# Patient Record
Sex: MALE | Race: WHITE | URBAN - METROPOLITAN AREA
[De-identification: names, ages, dates, MRNs, and addresses within clinical notes are randomized per-mention and may not be internally consistent; named-entity substitution may affect disease eponyms.]

---

## 2017-05-08 ENCOUNTER — APPOINTMENT (RX ONLY)
Dept: URBAN - METROPOLITAN AREA CLINIC 142 | Facility: CLINIC | Age: 67
Setting detail: DERMATOLOGY
End: 2017-05-08

## 2017-05-08 DIAGNOSIS — L82.1 OTHER SEBORRHEIC KERATOSIS: ICD-10-CM

## 2017-05-08 DIAGNOSIS — L73.8 OTHER SPECIFIED FOLLICULAR DISORDERS: ICD-10-CM

## 2017-05-08 DIAGNOSIS — L81.4 OTHER MELANIN HYPERPIGMENTATION: ICD-10-CM

## 2017-05-08 DIAGNOSIS — D22 MELANOCYTIC NEVI: ICD-10-CM

## 2017-05-08 DIAGNOSIS — L57.8 OTHER SKIN CHANGES DUE TO CHRONIC EXPOSURE TO NONIONIZING RADIATION: ICD-10-CM

## 2017-05-08 DIAGNOSIS — B07.8 OTHER VIRAL WARTS: ICD-10-CM

## 2017-05-08 PROBLEM — D22.61 MELANOCYTIC NEVI OF RIGHT UPPER LIMB, INCLUDING SHOULDER: Status: ACTIVE | Noted: 2017-05-08

## 2017-05-08 PROBLEM — F41.9 ANXIETY DISORDER, UNSPECIFIED: Status: ACTIVE | Noted: 2017-05-08

## 2017-05-08 PROBLEM — K21.9 GASTRO-ESOPHAGEAL REFLUX DISEASE WITHOUT ESOPHAGITIS: Status: ACTIVE | Noted: 2017-05-08

## 2017-05-08 PROBLEM — I10 ESSENTIAL (PRIMARY) HYPERTENSION: Status: ACTIVE | Noted: 2017-05-08

## 2017-05-08 PROBLEM — L55.1 SUNBURN OF SECOND DEGREE: Status: ACTIVE | Noted: 2017-05-08

## 2017-05-08 PROBLEM — D22.62 MELANOCYTIC NEVI OF LEFT UPPER LIMB, INCLUDING SHOULDER: Status: ACTIVE | Noted: 2017-05-08

## 2017-05-08 PROBLEM — J45.909 UNSPECIFIED ASTHMA, UNCOMPLICATED: Status: ACTIVE | Noted: 2017-05-08

## 2017-05-08 PROBLEM — F32.9 MAJOR DEPRESSIVE DISORDER, SINGLE EPISODE, UNSPECIFIED: Status: ACTIVE | Noted: 2017-05-08

## 2017-05-08 PROBLEM — D48.5 NEOPLASM OF UNCERTAIN BEHAVIOR OF SKIN: Status: ACTIVE | Noted: 2017-05-08

## 2017-05-08 PROBLEM — E78.5 HYPERLIPIDEMIA, UNSPECIFIED: Status: ACTIVE | Noted: 2017-05-08

## 2017-05-08 PROCEDURE — ? BIOPSY BY SHAVE METHOD

## 2017-05-08 PROCEDURE — ? LIQUID NITROGEN

## 2017-05-08 PROCEDURE — 99202 OFFICE O/P NEW SF 15 MIN: CPT | Mod: 25

## 2017-05-08 PROCEDURE — 69100 BIOPSY OF EXTERNAL EAR: CPT

## 2017-05-08 PROCEDURE — 17110 DESTRUCTION B9 LES UP TO 14: CPT

## 2017-05-08 PROCEDURE — ? COUNSELING

## 2017-05-08 ASSESSMENT — LOCATION DETAILED DESCRIPTION DERM
LOCATION DETAILED: LEFT CLAVICULAR SKIN
LOCATION DETAILED: LEFT PROXIMAL DORSAL FOREARM
LOCATION DETAILED: LEFT INFERIOR CENTRAL MALAR CHEEK
LOCATION DETAILED: LEFT PROXIMAL POSTERIOR UPPER ARM
LOCATION DETAILED: RIGHT PROXIMAL DORSAL FOREARM
LOCATION DETAILED: LEFT ELBOW
LOCATION DETAILED: RIGHT INFERIOR CENTRAL MALAR CHEEK
LOCATION DETAILED: LEFT DISTAL DORSAL FOREARM

## 2017-05-08 ASSESSMENT — LOCATION SIMPLE DESCRIPTION DERM
LOCATION SIMPLE: RIGHT CHEEK
LOCATION SIMPLE: LEFT CLAVICULAR SKIN
LOCATION SIMPLE: LEFT ELBOW
LOCATION SIMPLE: LEFT CHEEK
LOCATION SIMPLE: LEFT UPPER ARM
LOCATION SIMPLE: RIGHT FOREARM
LOCATION SIMPLE: LEFT FOREARM

## 2017-05-08 ASSESSMENT — LOCATION ZONE DERM
LOCATION ZONE: ARM
LOCATION ZONE: TRUNK
LOCATION ZONE: FACE

## 2017-05-08 ASSESSMENT — TOTAL NUMBER OF VERRUCA VULGARIS: # OF LESIONS?: 1

## 2017-05-08 ASSESSMENT — PAIN INTENSITY VAS: HOW INTENSE IS YOUR PAIN 0 BEING NO PAIN, 10 BEING THE MOST SEVERE PAIN POSSIBLE?: NO PAIN

## 2017-05-08 NOTE — PROCEDURE: LIQUID NITROGEN
Medical Necessity Information: It is in your best interest to select a reason for this procedure from the list below. All of these items fulfill various CMS LCD requirements except the new and changing color options.
Add 52 Modifier (Optional): no
Number Of Freeze-Thaw Cycles: 2 freeze-thaw cycles
Detail Level: Detailed
Duration Of Freeze Thaw-Cycle (Seconds): 5-10
Consent: The patient's verbal consent was obtained including but not limited to risks of crusting, scabbing, blistering, scarring, darker or lighter pigmentary change, recurrence, incomplete removal and infection.
Medical Necessity Clause: This procedure was medically necessary because the lesions that were treated were: growing,
Post-Care Instructions: I reviewed with the patient in detail post-care instructions. Patient is to wear sunprotection, and avoid picking at any of the treated lesions. Pt may apply Vaseline to crusted or scabbing areas.

## 2017-05-08 NOTE — PROCEDURE: BIOPSY BY SHAVE METHOD
Type Of Destruction Used: Curettage
Electrodesiccation Text: The wound bed was treated with electrodesiccation after the biopsy was performed.
Bill For Surgical Tray: no
Consent: Verbal consent was obtained and risks were reviewed including but not limited to scarring, infection, bleeding, scabbing, incomplete removal, nerve damage and allergy to anesthesia.
Detail Level: Detailed
Lab Facility: [id number]
Billing Type: Third-Party Bill
Silver Nitrate Text: The wound bed was treated with silver nitrate after the biopsy was performed.
Dressing: pressure dressing with telfa
X Size Of Lesion In Cm: 0
Anesthesia Type: 1% lidocaine with epinephrine and a 1:10 solution of 8.4% sodium bicarbonate
Lab: [id number]
Anesthesia Volume In Cc (Will Not Render If 0): 1
Hemostasis: Drysol
Cryotherapy Text: The wound bed was treated with cryotherapy after the biopsy was performed.
Post-Care Instructions: I reviewed with the patient in detail post-care instructions. Patient is to keep the biopsy site dry overnight, and then apply bacitracin twice daily until healed. Patient may apply hydrogen peroxide soaks to remove any crusting.
Biopsy Method: Dermablade
Biopsy Type: H and E
Notification Instructions: Patient will be notified of biopsy results. However, patient instructed to call the office if not contacted within 2 weeks.
Electrodesiccation And Curettage Text: The wound bed was treated with electrodesiccation and curettage after the biopsy was performed.
Wound Care: Vaseline

## 2017-06-05 ENCOUNTER — APPOINTMENT (RX ONLY)
Dept: URBAN - METROPOLITAN AREA CLINIC 142 | Facility: CLINIC | Age: 67
Setting detail: DERMATOLOGY
End: 2017-06-05

## 2017-06-05 DIAGNOSIS — L57.0 ACTINIC KERATOSIS: ICD-10-CM

## 2017-06-05 DIAGNOSIS — B07.8 OTHER VIRAL WARTS: ICD-10-CM | Status: RESOLVED

## 2017-06-05 PROCEDURE — ? COUNSELING

## 2017-06-05 PROCEDURE — ? LIQUID NITROGEN

## 2017-06-05 PROCEDURE — 99213 OFFICE O/P EST LOW 20 MIN: CPT | Mod: 25

## 2017-06-05 PROCEDURE — 17000 DESTRUCT PREMALG LESION: CPT

## 2017-06-05 ASSESSMENT — LOCATION SIMPLE DESCRIPTION DERM
LOCATION SIMPLE: LEFT ELBOW
LOCATION SIMPLE: LEFT EAR

## 2017-06-05 ASSESSMENT — LOCATION DETAILED DESCRIPTION DERM
LOCATION DETAILED: LEFT ELBOW
LOCATION DETAILED: LEFT ANTITRAGUS

## 2017-06-05 ASSESSMENT — LOCATION ZONE DERM
LOCATION ZONE: EAR
LOCATION ZONE: ARM

## 2017-06-05 NOTE — PROCEDURE: LIQUID NITROGEN
Number Of Freeze-Thaw Cycles: 2 freeze-thaw cycles
Consent: The patient's verbal consent was obtained including but not limited to risks of crusting, scabbing, blistering, scarring, darker or lighter pigmentary change, recurrence, incomplete removal and infection.
Duration Of Freeze Thaw-Cycle (Seconds): 6
Detail Level: Detailed
Render Post-Care Instructions In Note?: no
Post-Care Instructions: I reviewed with the patient in detail post-care instructions. Patient is to wear sunprotection, and avoid picking at any of the treated lesions. Pt may apply Vaseline to crusted or scabbing areas.

## 2020-06-06 ENCOUNTER — HOSPITAL ENCOUNTER (INPATIENT)
Dept: HOSPITAL 35 - ER | Age: 70
LOS: 5 days | Discharge: NURSING, REHAB OR LONG-TERM CARE | DRG: 882 | End: 2020-06-11
Attending: PSYCHIATRY & NEUROLOGY | Admitting: PSYCHIATRY & NEUROLOGY
Payer: COMMERCIAL

## 2020-06-06 VITALS — HEIGHT: 67 IN | BODY MASS INDEX: 27.56 KG/M2 | WEIGHT: 175.6 LBS

## 2020-06-06 VITALS — DIASTOLIC BLOOD PRESSURE: 71 MMHG | SYSTOLIC BLOOD PRESSURE: 119 MMHG

## 2020-06-06 VITALS — DIASTOLIC BLOOD PRESSURE: 51 MMHG | SYSTOLIC BLOOD PRESSURE: 107 MMHG

## 2020-06-06 VITALS — SYSTOLIC BLOOD PRESSURE: 139 MMHG | DIASTOLIC BLOOD PRESSURE: 66 MMHG

## 2020-06-06 VITALS — DIASTOLIC BLOOD PRESSURE: 64 MMHG | SYSTOLIC BLOOD PRESSURE: 101 MMHG

## 2020-06-06 DIAGNOSIS — F10.20: ICD-10-CM

## 2020-06-06 DIAGNOSIS — F32.9: ICD-10-CM

## 2020-06-06 DIAGNOSIS — J44.9: ICD-10-CM

## 2020-06-06 DIAGNOSIS — Z03.818: ICD-10-CM

## 2020-06-06 DIAGNOSIS — I10: ICD-10-CM

## 2020-06-06 DIAGNOSIS — F43.24: Primary | ICD-10-CM

## 2020-06-06 DIAGNOSIS — Z79.899: ICD-10-CM

## 2020-06-06 DIAGNOSIS — R45.851: ICD-10-CM

## 2020-06-06 DIAGNOSIS — F01.50: ICD-10-CM

## 2020-06-06 DIAGNOSIS — E78.5: ICD-10-CM

## 2020-06-06 DIAGNOSIS — Z88.8: ICD-10-CM
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
## 2020-06-06 DIAGNOSIS — Z79.82: ICD-10-CM

## 2020-06-06 DIAGNOSIS — K21.9: ICD-10-CM

## 2020-06-06 DIAGNOSIS — F19.90: ICD-10-CM

## 2020-06-06 LAB
ALBUMIN SERPL-MCNC: 3.4 G/DL (ref 3.4–5)
ALT SERPL-CCNC: 24 U/L (ref 30–65)
ANION GAP SERPL CALC-SCNC: 7 MMOL/L (ref 7–16)
AST SERPL-CCNC: 20 U/L (ref 15–37)
BASOPHILS NFR BLD AUTO: 1.1 % (ref 0–2)
BENZODIAZ UR-MCNC: POSITIVE UG/L
BILIRUB SERPL-MCNC: 0.5 MG/DL (ref 0.2–1)
BILIRUB UR-MCNC: NEGATIVE MG/DL
BUN SERPL-MCNC: 19 MG/DL (ref 7–18)
CALCIUM SERPL-MCNC: 8.8 MG/DL (ref 8.5–10.1)
CHLORIDE SERPL-SCNC: 104 MMOL/L (ref 98–107)
CO2 SERPL-SCNC: 28 MMOL/L (ref 21–32)
COLOR UR: YELLOW
CREAT SERPL-MCNC: 1.1 MG/DL (ref 0.7–1.3)
EOSINOPHIL NFR BLD: 2.8 % (ref 0–3)
ERYTHROCYTE [DISTWIDTH] IN BLOOD BY AUTOMATED COUNT: 14.4 % (ref 10.5–14.5)
GLUCOSE SERPL-MCNC: 96 MG/DL (ref 74–106)
GRANULOCYTES NFR BLD MANUAL: 54.7 % (ref 36–66)
HCT VFR BLD CALC: 43.9 % (ref 42–52)
HGB BLD-MCNC: 15 GM/DL (ref 14–18)
KETONES UR STRIP-MCNC: (no result) MG/DL
LYMPHOCYTES NFR BLD AUTO: 30.8 % (ref 24–44)
MCH RBC QN AUTO: 31.3 PG (ref 26–34)
MCHC RBC AUTO-ENTMCNC: 34.2 G/DL (ref 28–37)
MCV RBC: 91.7 FL (ref 80–100)
MONOCYTES NFR BLD: 10.6 % (ref 1–8)
NEUTROPHILS # BLD: 3.1 THOU/UL (ref 1.4–8.2)
PLATELET # BLD: 131 THOU/UL (ref 150–400)
POTASSIUM SERPL-SCNC: 3.9 MMOL/L (ref 3.5–5.1)
PROT SERPL-MCNC: 7.1 G/DL (ref 6.4–8.2)
RBC # BLD AUTO: 4.79 MIL/UL (ref 4.5–6)
RBC # UR STRIP: NEGATIVE /UL
SODIUM SERPL-SCNC: 139 MMOL/L (ref 136–145)
SP GR UR STRIP: 1.02 (ref 1–1.03)
URINE CLARITY: CLEAR
URINE GLUCOSE-RANDOM*: NEGATIVE
URINE LEUKOCYTES-REFLEX: NEGATIVE
URINE NITRITE-REFLEX: NEGATIVE
URINE PROTEIN (DIPSTICK): NEGATIVE
UROBILINOGEN UR STRIP-ACNC: >= 8 E.U./DL (ref 0.2–1)
WBC # BLD AUTO: 5.7 THOU/UL (ref 4–11)

## 2020-06-06 PROCEDURE — 10880: CPT

## 2020-06-06 NOTE — NUR
DR BECKER CALLED TO ENSURE PT IS ABLE TO GIVE A URIUNE SPECIMEN.  ADVISED
TO STRAIGHT-CATH PT IF UNWILLING OR UNABLE.  CANNOT DETERMINE ELIGIBILITY FOR 
PSYCH UNIT UNTIL SPECIMEN IS COLLECTED TO R/O UTI
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
## 2020-06-07 VITALS — SYSTOLIC BLOOD PRESSURE: 143 MMHG | DIASTOLIC BLOOD PRESSURE: 81 MMHG

## 2020-06-07 VITALS — DIASTOLIC BLOOD PRESSURE: 61 MMHG | SYSTOLIC BLOOD PRESSURE: 105 MMHG

## 2020-06-07 VITALS — SYSTOLIC BLOOD PRESSURE: 105 MMHG | DIASTOLIC BLOOD PRESSURE: 61 MMHG
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
## 2020-06-07 NOTE — NUR
Assumed care of pt @ 1900. Pt calm et cooperative with pleasant demeanor this
shift. Took medications whole without difficulty. Isolated in room most of
shift. Very pleasant with this nurse et stated to have a "blessed day" after
every encounter. VSWNL. Health assessment with no abnormalities noted at
present time. Denies SI/HI et states that he is aware that is why he is here
but he is not feeling suicidal at the present time. Did not socialize with
peers or roommate this shift. Currently resting in bed with eyes closed. Will
continue to monitor per protocol.

## 2020-06-07 NOTE — NUR
ASSUMED CARE OF PATIENT AT APPROXIMATELY 1915, PATIENT IN DAY ROOM MINIMALLY
INTERACTING WITH OTHER PEERS. UPON ONE TO ONE PT OBSERVED WITH A BLUNTED
AFFECT, AND WOULD MINIMALLY VERBALIZE WITH THIS NURSE. PATIENT THEN WENT TO
HIS ROOM AND STOOD IN THE DOOR WAY. THIS NURSE ADMINISTERED HS MEDICATION TO
PATIENT AND PATIENT STATED "ITS ABOUT DAMN TIME." HE BECAME DEMANDING IN NEEDS
AT THIS TIME, BUT WAS PROVIDED. AT THIS TIME PATIENT IS IN BED WITH EYES
CLOSED, RR EVEN AND UNLABORED, NO S/S OF DISTRESS. PT DID NOT REPORT MEDICAL
CONCERNS. NURSING WILL MAINTAIN ALL PRECAUTIONS TO ENSURE SAFETY AT ALL TIMES.

## 2020-06-07 NOTE — NUR
PT SITTING OUT IN DINING ROOM. PT STATED HE HAS ANXIETY OF 8 ON 1-10 SCALE. PT
DENIES ANY PAIN. PT TOOK MEDS WITHOUT ANY ISSUES. NO SIGNS OF TREMORS OR
HEADACHE. PT WANTING TO USE THE PHONE AFTER BREAKFAST.

## 2020-06-08 VITALS — SYSTOLIC BLOOD PRESSURE: 138 MMHG | DIASTOLIC BLOOD PRESSURE: 85 MMHG

## 2020-06-08 VITALS — SYSTOLIC BLOOD PRESSURE: 93 MMHG | DIASTOLIC BLOOD PRESSURE: 51 MMHG

## 2020-06-08 VITALS — DIASTOLIC BLOOD PRESSURE: 51 MMHG | SYSTOLIC BLOOD PRESSURE: 93 MMHG

## 2020-06-08 NOTE — NUR
PATIENT AWOKE AND CAME TO NURSES STATION. HE STATES HE CAN'T SLEEP AND WANTS
SOMETHING TO HELP SLEEP. HE STATES HIS BACK WAS ACHING SOME TOO. TYLENOL 650MG
WAS SUGGESTED AND PATIENT SAID NO THAT HE NEEDS SOMETHING FOR SLEEP MORE. WENT
AHEAD AND GAVE PATIENT TRAZADONE 50MG WITH TYLENOL 650MG PO FOR BACK PAIN AND
SLEEP. 5/10 ON PAIN. WHEN I TOOK THE MEDS TO THE PATIENT HE STATES THAT HE IS
HAVING REOCCURRING NIGHTMARES AGAIN. I ASKED HIM IF THEY WERE SOMETHING THAT
HAPPENED TO HIM IN THE PAST OR SOMETHING TOTALLY DIFFERENT. HE STATED, "THEY
ARE JUST VERY BLOODY NIGHTMARES." HE DID NOT WANT TO TALK ABOUT IT BEYOND
THAT. PATIENT TOOK PILLS WHOLE WITH WATER AND LAID BACK DOWN TO SLEEP. HE WAS
PLEASANT AND COOOPERATIVE AND APPEARRED CALM. CONTINUING TO MONITOR.

## 2020-06-08 NOTE — NUR
PT GIVEN OLANZAPINE 2.5MG PO WITH WATER. PATIENT LAID BACK DOWN TO TRY AN
SLEEP. PT IS IRRITABLE FROM BROKEN SLEEP AND THE BLOODY NIGHTMARES HE IS
HAVING. HE DID ASK WHAT THE MED WAS AND IS HOPING IT WILL WORK FOR HIS NIGHT
DAVIES.

## 2020-06-08 NOTE — NUR
CHUCKY HAS BEEN VERY CORGAL WITH STAFF AND DR. LOCKE RECIEVED ORDERS FROM  TO HELP
WITH SLEEP TONIGHT.

## 2020-06-08 NOTE — EKG
[facility]
[address]
[place]   [address]                     ELECTROCARDIOGRAM REPORT      
_______________________________________________________________________________
 
Name:       DAMIAN CISSE               Room #:         [facility]      ADM IN  
M.R.#:      [id number]                       Account #:      [id number]  
Admission:  20    Attend Phys:    Marshal Plasencia DO
Discharge:              Date of Birth:  [birth year]  
                                                          Report #: [id number]
                                                                    [id number]
_______________________________________________________________________________
THIS REPORT FOR:  
 
cc:  FAM - Family physician unknown
     FAM - Family physician unknown
     Shaheed Marroquin MD [facility]
THIS REPORT FOR:   //name//                          
 
                         [facility] ED
                                       
Test Date:    2020               Test Time:    10:34:54
Pat Name:     DAMIAN CISSE            Department:   
Patient ID:   [id number]            Room:         [facility]
Gender:       M                        Technician:   DENIS
:          1950               Requested By: Florian Harmon
Order Number: [id number] MD:   Shaheed Marroquin
                                 Measurements
Intervals                              Axis          
Rate:         72                       P:            49
RI:           184                      QRS:          -30
QRSD:         118                      T:            61
QT:           412                                    
QTc:          451                                    
                           Interpretive Statements
Sinus rhythm
Incomplete RBBB and LAFB
No previous ECG available for comparison
 
Electronically Signed On 2020 8:37:12 CDT by Shaheed Marroquin
[email or web address]
 
 
 
 
 
 
 
 
 
 
 
 
 
 
 
  <ELECTRONICALLY SIGNED>
   By: Shaheed Marroquin MD, FACC   
  20     0837
D: 20 1034                           _____________________________________
T: 20 1034                           Shaheed Marroquin MD, [facility]     /EPI

## 2020-06-08 NOTE — NUR
Patient up again at this time.  Patient ran out of room saying "It isn't
working".  After further assessment, patient states that he is able to fall
asleep but once he falls asleep, he is having bloody nightmares of people
being killed.  Patient appears anxious, states that he is scared.  Patient was
not willing to elaborate any further on nightmares.

## 2020-06-08 NOTE — NUR
THERESA left message with  [facility]
[phone] asking for a return call to discuss patient's history and a copy
of patient's medical record.
 
Dr. Plasencia left message for patient's PA  asking for return call
to discuss patient's history.
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
## 2020-06-08 NOTE — NUR
Care of patient assumed at 1915.  Patient is in and out of day room.  Calm and
ccoperative with assessment.  States that [facility] sent him here because
they messed up all of his meds and didn't know how to fix it.  Rates anxiety
and depression 7/10 while laughing and joking.  Rates lower back pain 7/10 on
numeric scale, while PAINAD scale scores 0.  Does not request any pain meds.
A/O x4.  Reports last BM this evening (6/8).  HS, LS, BS all WNL.  Compliant
with HS meds and retires to bed shortly after.

## 2020-06-09 VITALS — DIASTOLIC BLOOD PRESSURE: 48 MMHG | SYSTOLIC BLOOD PRESSURE: 93 MMHG

## 2020-06-09 VITALS — SYSTOLIC BLOOD PRESSURE: 118 MMHG | DIASTOLIC BLOOD PRESSURE: 71 MMHG

## 2020-06-09 NOTE — NUR
[facility] CARE ON 06/08/20 @ 2020, IN BED AT BEGINNING OF SHIFT.  REPORTS WATERY
DIARRHEA TODAY. DENIES FOOD ALLERGIES.  SAYS THE HOSPITAL FOOD GIVES HIM
DIARRHEA.  DENIES SI AND HI. REPORTS HE IS IN THE HOSPITAL BECUSE OF SUICIDAL
THOUGHTS.  AND BECAUSE HE DID NOT LIKE  [facility].  DENIES H, A&V. DENIES
PAIN.  A&O X 3, REPORTS THAT HE HIMSELF IS THE PRESIDENT.

## 2020-06-09 NOTE — NUR
Obie called a second time to admssions at [facility] and left another VM. OBIE
also tried the coordiantor  but unable to leave a VM because her
VM box is full.

## 2020-06-09 NOTE — NUR
THERESA called and confimred with the COVID 19 HS Diane that this pt will be
visitied by Barbara AZEVEDO at 1pm on 6/10.

## 2020-06-09 NOTE — NUR
Up ambulating in unit with slow, steady gait.  Alert and orientated X4.
States he is here d/t not liking [facility].  Irritated and frustrated with
roommate, states he kept him up all night and he couldn't sleep.  Denies
SI/HI.  Calm and compliant later in day.  Breath sounds clear t/o with
occassional slight expiratory wheeze.  Reg HR auscultated.  Color pink with
brisk capillary refill and palpable peripheral pulses.  No edema noted.
Independent with voiding.  Active bowel sounds over soft, rounded abdomen.
Stated last BM was yesterday but then came to me later in AM stating he had 3
loose stools in 3 mins.  Encouraged to drink fluids and gave cup of H2O.
Denied additional stools at this writing.  Currently sitting in day room
watching TV with peers.  No s/o distress.

## 2020-06-10 VITALS — SYSTOLIC BLOOD PRESSURE: 93 MMHG | DIASTOLIC BLOOD PRESSURE: 48 MMHG

## 2020-06-10 VITALS — SYSTOLIC BLOOD PRESSURE: 120 MMHG | DIASTOLIC BLOOD PRESSURE: 65 MMHG

## 2020-06-10 VITALS — DIASTOLIC BLOOD PRESSURE: 74 MMHG | SYSTOLIC BLOOD PRESSURE: 138 MMHG

## 2020-06-10 NOTE — NUR
Obie received a VM that the auth was not completed yet and there might be an
issue with a later d/c. Obie checked with Dr ellison and he advised that d/c
can be first thing tomorrow am.OBIE relayed this rox  VM to Edwardo at [facility] and nursing.

## 2020-06-10 NOTE — NUR
Assumed care on 06/10/20 @ 1900, pleasant affect, greeted this nurse with a
wave and a smile. A&Ox3-4. Cooperated with assessment, reports looking
forward to leaving the hospital tomorrow.  Took meds whole with water. Wife
reports to staff that she only wants to see him through a window at the
facility to which he is discharging. patient reorts diarrhea, although reports
it is not as bad today.  Trazadone 150 provided @ 20:45. Denies pain. In bed
eyes closed, respirations even and unlabored, bed in low position.

## 2020-06-10 NOTE — NUR
OBIE spoke with admissions this AM and reported that pt was ready to d/c. Obie
then faxed the pt and ot notes iwth the neg COVID 19 test results. Later
admissions from [facility] called and they are now pending auth for pt to
return. It is possible this could be today. Later Obie set up a DOXY.ME
conference with Barbara AZEVEDO and this pt instread of the visit that was set for
today at 1pm. Pt visited with Barbara and then Obie was able to report that pt was
likely d/c today or maybe tomorrow.

## 2020-06-11 VITALS — SYSTOLIC BLOOD PRESSURE: 118 MMHG | DIASTOLIC BLOOD PRESSURE: 64 MMHG

## 2020-06-11 VITALS — SYSTOLIC BLOOD PRESSURE: 138 MMHG | DIASTOLIC BLOOD PRESSURE: 74 MMHG

## 2020-06-11 NOTE — NUR
0715  Up ambulating in unit without s/o distress.  Regular, steady gait.
Alert and orientated X 4.  Unhappy with plan to transfer back to [place].
Denies SI/HI and states that he knows he made a mistake in claiming SI that
got him transferred.  Breath sounds clear t/o with good aeration.  No s/o resp
distress.  Reg HR auscultated.  Color pink with brisk capillary refill and
palpable peripheral pulses.  No s/o edema.  Independent with voiding.  Active
bowel sounds over soft, rounded abdomen.  No s/o pressure sores or lesions.
 
1120 Report called to [facility].  Also called public
, talked with .  Aware of pending transfer and
consent to transfer.
 
1210  Reviewed discharge instructions with pt which he signed.  No s/o
distress.  Transported per WC to transport van.

## 2020-06-11 NOTE — NUR
OBIE spoke with Edwardo with admisions at [facility] and it was reported that
they were trying to skill him again but insurance was denying it. OBIE advised
that pt will have to go back there today and their business office will have
to complete the [place] medicaid change if needed. They are aware that this pt was
expected to be with them unitl his court date regarding his guardianship in 4
months. Obie setup transportation for 12pm. Reported this to nursing.

## 2020-06-11 NOTE — NUR
Obie received a meenu from the PA with [facility] and she confimred she
recieved the d/c summary and granted permission for this pt to back to [facility]
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
## 2021-03-22 ENCOUNTER — HOSPITAL ENCOUNTER (EMERGENCY)
Dept: HOSPITAL 96 - M.ERS | Age: 71
Discharge: HOME | End: 2021-03-22
Payer: MEDICARE

## 2021-03-22 VITALS — WEIGHT: 180.01 LBS | HEIGHT: 67 IN | BODY MASS INDEX: 28.25 KG/M2

## 2021-03-22 VITALS — DIASTOLIC BLOOD PRESSURE: 72 MMHG | SYSTOLIC BLOOD PRESSURE: 135 MMHG

## 2021-03-22 DIAGNOSIS — F10.129: Primary | ICD-10-CM

## 2021-03-22 DIAGNOSIS — Z20.822: ICD-10-CM

## 2021-03-22 DIAGNOSIS — J44.9: ICD-10-CM
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
## 2021-03-22 LAB
ALBUMIN SERPL-MCNC: 3.3 G/DL (ref 3.4–5)
ALP SERPL-CCNC: 88 U/L (ref 46–116)
ALT SERPL-CCNC: 40 U/L (ref 30–65)
ANION GAP SERPL CALC-SCNC: 12 MMOL/L (ref 7–16)
APAP SERPL-MCNC: < 2 UG/ML (ref 10–30)
AST SERPL-CCNC: 35 U/L (ref 15–37)
BENZODIAZ UR-MCNC: POSITIVE UG/L
BILIRUB SERPL-MCNC: 0.3 MG/DL
BILIRUB UR-MCNC: NEGATIVE MG/DL
BUN SERPL-MCNC: 9 MG/DL (ref 7–18)
CALCIUM SERPL-MCNC: 8.5 MG/DL (ref 8.5–10.1)
CHLORIDE SERPL-SCNC: 108 MMOL/L (ref 98–107)
CO2 SERPL-SCNC: 26 MMOL/L (ref 21–32)
COLOR UR: YELLOW
CREAT SERPL-MCNC: 0.9 MG/DL (ref 0.6–1.3)
ETHANOL SERPL-MCNC: 218 MG/DL (ref ?–10)
GLUCOSE SERPL-MCNC: 107 MG/DL (ref 70–99)
HCT VFR BLD CALC: 42.8 % (ref 42–52)
HGB BLD-MCNC: 14.2 GM/DL (ref 14–18)
KETONES UR STRIP-MCNC: NEGATIVE MG/DL
MCH RBC QN AUTO: 30.3 PG (ref 26–34)
MCHC RBC AUTO-ENTMCNC: 33.2 G/DL (ref 28–37)
MCV RBC: 91.2 FL (ref 80–100)
MPV: 7.4 FL. (ref 7.2–11.1)
NITRITE UR QL STRIP: NEGATIVE
PLATELET COUNT*: 194 THOU/UL (ref 150–400)
POTASSIUM SERPL-SCNC: 3.3 MMOL/L (ref 3.5–5.1)
PROT SERPL-MCNC: 7.1 G/DL (ref 6.4–8.2)
PROT UR QL STRIP: NEGATIVE
RBC # BLD AUTO: 4.69 MIL/UL (ref 4.5–6)
RBC # UR STRIP: NEGATIVE /UL
RDW-CV: 17 % (ref 10.5–14.5)
SALICYLATES SERPL-MCNC: < 2.8 MG/DL (ref 2.8–20)
SODIUM SERPL-SCNC: 146 MMOL/L (ref 136–145)
SP GR UR STRIP: 1.02 (ref 1–1.03)
URINE CLARITY: CLEAR
URINE GLUCOSE-RANDOM: NEGATIVE
URINE LEUKOCYTES: NEGATIVE
UROBILINOGEN UR STRIP-ACNC: 0.2 E.U./DL (ref 0.2–1)
WBC # BLD AUTO: 6.1 THOU/UL (ref 4–11)

## 2021-07-29 ENCOUNTER — HOSPITAL ENCOUNTER (INPATIENT)
Dept: HOSPITAL 96 - M.ERS | Age: 71
LOS: 15 days | Discharge: HOME HEALTH | DRG: 896 | End: 2021-08-13
Attending: INTERNAL MEDICINE | Admitting: INTERNAL MEDICINE
Payer: COMMERCIAL

## 2021-07-29 VITALS — DIASTOLIC BLOOD PRESSURE: 79 MMHG | SYSTOLIC BLOOD PRESSURE: 140 MMHG

## 2021-07-29 VITALS — BODY MASS INDEX: 28.73 KG/M2 | HEIGHT: 68 IN | WEIGHT: 189.6 LBS

## 2021-07-29 DIAGNOSIS — G92: ICD-10-CM

## 2021-07-29 DIAGNOSIS — Z87.891: ICD-10-CM

## 2021-07-29 DIAGNOSIS — R45.851: ICD-10-CM

## 2021-07-29 DIAGNOSIS — I25.10: ICD-10-CM

## 2021-07-29 DIAGNOSIS — E43: ICD-10-CM

## 2021-07-29 DIAGNOSIS — F10.239: ICD-10-CM

## 2021-07-29 DIAGNOSIS — E87.6: ICD-10-CM

## 2021-07-29 DIAGNOSIS — J44.1: ICD-10-CM

## 2021-07-29 DIAGNOSIS — I25.2: ICD-10-CM

## 2021-07-29 DIAGNOSIS — F10.229: Primary | ICD-10-CM

## 2021-07-29 DIAGNOSIS — F32.9: ICD-10-CM

## 2021-07-29 DIAGNOSIS — Z79.899: ICD-10-CM

## 2021-07-29 DIAGNOSIS — I69.354: ICD-10-CM

## 2021-07-29 DIAGNOSIS — Z20.822: ICD-10-CM

## 2021-07-29 DIAGNOSIS — I10: ICD-10-CM

## 2021-07-29 DIAGNOSIS — E87.0: ICD-10-CM

## 2021-07-29 DIAGNOSIS — E78.5: ICD-10-CM

## 2021-07-29 LAB
ANION GAP SERPL CALC-SCNC: 15 MMOL/L (ref 7–16)
APAP SERPL-MCNC: < 2 UG/ML (ref 10–30)
BENZODIAZ UR-MCNC: POSITIVE UG/L
BILIRUB UR-MCNC: NEGATIVE MG/DL
BUN SERPL-MCNC: 5 MG/DL (ref 7–18)
CALCIUM SERPL-MCNC: 8 MG/DL (ref 8.5–10.1)
CHLORIDE SERPL-SCNC: 106 MMOL/L (ref 98–107)
CO2 SERPL-SCNC: 24 MMOL/L (ref 21–32)
COLOR UR: YELLOW
CREAT SERPL-MCNC: 1 MG/DL (ref 0.6–1.3)
GLUCOSE SERPL-MCNC: 110 MG/DL (ref 70–99)
HCT VFR BLD CALC: 41.6 % (ref 42–52)
HGB BLD-MCNC: 14.2 GM/DL (ref 14–18)
KETONES UR STRIP-MCNC: NEGATIVE MG/DL
MCH RBC QN AUTO: 31.3 PG (ref 26–34)
MCHC RBC AUTO-ENTMCNC: 34.1 G/DL (ref 28–37)
MCV RBC: 91.7 FL (ref 80–100)
MPV: 6.9 FL. (ref 7.2–11.1)
NITRITE UR QL STRIP: NEGATIVE
PLATELET COUNT*: 202 THOU/UL (ref 150–400)
POTASSIUM SERPL-SCNC: 2.9 MMOL/L (ref 3.5–5.1)
PROT UR QL STRIP: NEGATIVE
RBC # BLD AUTO: 4.53 MIL/UL (ref 4.5–6)
RBC # UR STRIP: (no result) /UL
RDW-CV: 14.5 % (ref 10.5–14.5)
SALICYLATES SERPL-MCNC: < 2.8 MG/DL (ref 2.8–20)
SODIUM SERPL-SCNC: 145 MMOL/L (ref 136–145)
SP GR UR STRIP: <= 1.005 (ref 1–1.03)
URINE CLARITY: CLEAR
URINE GLUCOSE-RANDOM: NEGATIVE
URINE LEUKOCYTES: NEGATIVE
UROBILINOGEN UR STRIP-ACNC: 0.2 E.U./DL (ref 0.2–1)
WBC # BLD AUTO: 10.2 THOU/UL (ref 4–11)

## 2021-07-30 VITALS — DIASTOLIC BLOOD PRESSURE: 58 MMHG | SYSTOLIC BLOOD PRESSURE: 140 MMHG

## 2021-07-30 VITALS — DIASTOLIC BLOOD PRESSURE: 44 MMHG | SYSTOLIC BLOOD PRESSURE: 132 MMHG

## 2021-07-30 VITALS — SYSTOLIC BLOOD PRESSURE: 146 MMHG | DIASTOLIC BLOOD PRESSURE: 65 MMHG

## 2021-07-30 VITALS — DIASTOLIC BLOOD PRESSURE: 81 MMHG | SYSTOLIC BLOOD PRESSURE: 131 MMHG

## 2021-07-30 VITALS — SYSTOLIC BLOOD PRESSURE: 150 MMHG | DIASTOLIC BLOOD PRESSURE: 76 MMHG

## 2021-07-30 VITALS — SYSTOLIC BLOOD PRESSURE: 148 MMHG | DIASTOLIC BLOOD PRESSURE: 70 MMHG

## 2021-07-30 VITALS — SYSTOLIC BLOOD PRESSURE: 129 MMHG | DIASTOLIC BLOOD PRESSURE: 57 MMHG

## 2021-07-30 VITALS — DIASTOLIC BLOOD PRESSURE: 76 MMHG | SYSTOLIC BLOOD PRESSURE: 150 MMHG

## 2021-07-30 VITALS — DIASTOLIC BLOOD PRESSURE: 66 MMHG | SYSTOLIC BLOOD PRESSURE: 128 MMHG

## 2021-07-30 VITALS — SYSTOLIC BLOOD PRESSURE: 153 MMHG | DIASTOLIC BLOOD PRESSURE: 62 MMHG

## 2021-07-30 VITALS — DIASTOLIC BLOOD PRESSURE: 60 MMHG | SYSTOLIC BLOOD PRESSURE: 132 MMHG

## 2021-07-30 VITALS — DIASTOLIC BLOOD PRESSURE: 67 MMHG | SYSTOLIC BLOOD PRESSURE: 138 MMHG

## 2021-07-30 LAB
INR PPP: 1.1
MAGNESIUM SERPL-MCNC: 1.9 MG/DL (ref 1.8–2.4)
PHOSPHATE SERPL-MCNC: 2.8 MG/DL (ref 2.5–4.9)
PROTHROMBIN TIME: 11.4 SECONDS (ref 9.2–11.5)

## 2021-07-30 NOTE — NUR
ADMITTED FROM ER. STATES HIS FRIEND LUPE CHU IS THE PERSON TO CONTACT. SHE
MET HIM IN THE LAST MONTH DURING THEIR STAY AT [facility]. SHE IS HIS
TRANSPORTATION, SUPPORT AND HE SOMETIMES CALLS HER HIS "WIFE"
SHE CLEARLY STATES PATIENT HAS A WIFE ASHLEE AT HOME. WE DON'T HAVE HER #, AS
HE DOESN'T KNOW IT, AND LUPE DOESN'T EITHER. WIFE ASHLEE ALSO ALCOHOLIC.
PATIENT HAS A PLAN TO GO TO RESIDENTIAL TREATMENT ON TUESDAY.
PATIENT INITIALLY STATES HE WANTS TO SLIT HIS WRISTS TO KILL HIMSELF, BUT THEN
STATES HE HAS A REASON TO LIVE AND DOESN'T PLAN TO KILL HIMSELF.

## 2021-07-30 NOTE — NUR
UPDATED QIAN RN ON PATIENT
TRANSPORTED TO TELE VIA WHEELCHAIR. TELEPSYCH dr to CALL DR. ANDUJAR. DR ANDUJAR
PAGED TO ASK ABOUT ORDERS BASED ON RECOMMENDATIONS

## 2021-07-30 NOTE — NUR
gallito spk w/ pt who inidicated he lives at home with his wife in an apt, but pt
couldnt recall his wife number. pt has a walker. pt [facility]. pt wanted to rtrn to inpt psych as he has stated he has ruminating
negative, self harming thoughts. pt stated he stop taking his
bi-polar medication "about 5 days ago" bc he wanted to drink. pt drink of
choice is whiskey shots. non-compliance with meds and drinkig enhancing SI.
the POC is for a pysch eval.

## 2021-07-30 NOTE — EKG
[place] [address]
Phone:  [phone]                     ELECTROCARDIOGRAM REPORT      
_______________________________________________________________________________
 
Name:         GARY CISSEHARSHAL KENNY               Room:          [address]    [place] 
M.R.#:    [id number]     Account #:     [id number]  
Admission:    21    Attend Phys:   Wolfgang Coto, 
Discharge:                Date of Birth: [birth year]  
Date of Service: 21 0704  Report #:      [id number]
        [id number]
_______________________________________________________________________________
THIS REPORT FOR:  //name//                      
 
                         [facility] ED
                                       
Test Date:    2021               Test Time:    07:04:13
Pat Name:     DAMIAN CISSE            Department:   
Patient ID:   [id number]            Room:         [facility]
Gender:       M                        Technician:   KAYCEE
:          1950               Requested By: Yuliet Prieto
Order Number: [id number] MD:   Ryan Chin
                                 Measurements
Intervals                              Axis          
Rate:         74                       P:            72
KY:           154                      QRS:          -42
QRSD:         128                      T:            29
QT:           425                                    
QTc:          472                                    
                           Interpretive Statements
Sinus rhythm
RBBB and LAFB
Compared to ECG 10/09/2017 13:09:45
Left anterior fascicular block persists
Right bundle-branch block now present
Electronically Signed On 2021 15:17:05 CDT by Ryan Chin
[email or web address]
 
 
 
 
 
 
 
 
 
 
 
 
 
 
 
 
 
 
 
  <ELECTRONICALLY SIGNED>
                                           By: Ryan Chin MD, [facility]     
  21     1517
D: 2104   _____________________________________
T: 21 0704   Ryan Chin MD, [facility]       /EPI
Stable

## 2021-07-31 VITALS — SYSTOLIC BLOOD PRESSURE: 126 MMHG | DIASTOLIC BLOOD PRESSURE: 57 MMHG

## 2021-07-31 VITALS — DIASTOLIC BLOOD PRESSURE: 77 MMHG | SYSTOLIC BLOOD PRESSURE: 147 MMHG

## 2021-07-31 VITALS — SYSTOLIC BLOOD PRESSURE: 157 MMHG | DIASTOLIC BLOOD PRESSURE: 77 MMHG

## 2021-07-31 VITALS — DIASTOLIC BLOOD PRESSURE: 89 MMHG | SYSTOLIC BLOOD PRESSURE: 149 MMHG

## 2021-07-31 VITALS — DIASTOLIC BLOOD PRESSURE: 40 MMHG | SYSTOLIC BLOOD PRESSURE: 104 MMHG

## 2021-07-31 VITALS — SYSTOLIC BLOOD PRESSURE: 120 MMHG | DIASTOLIC BLOOD PRESSURE: 60 MMHG

## 2021-07-31 VITALS — SYSTOLIC BLOOD PRESSURE: 155 MMHG | DIASTOLIC BLOOD PRESSURE: 83 MMHG

## 2021-07-31 LAB
ANION GAP SERPL CALC-SCNC: 10 MMOL/L (ref 7–16)
BUN SERPL-MCNC: 11 MG/DL (ref 7–18)
CALCIUM SERPL-MCNC: 7.3 MG/DL (ref 8.5–10.1)
CHLORIDE SERPL-SCNC: 112 MMOL/L (ref 98–107)
CO2 SERPL-SCNC: 25 MMOL/L (ref 21–32)
CREAT SERPL-MCNC: 1.2 MG/DL (ref 0.6–1.3)
GLUCOSE SERPL-MCNC: 139 MG/DL (ref 70–99)
POTASSIUM SERPL-SCNC: 3.5 MMOL/L (ref 3.5–5.1)
SODIUM SERPL-SCNC: 147 MMOL/L (ref 136–145)

## 2021-07-31 NOTE — NUR
Pt remained A&Ox3 for entire shift. Pt pleasant with staff, vital signs
stable. Pt is impulsive and will climb out of bed to go to the bathroom. Fall
precautions in place. Seizure precautions in place. Pt's friend at bedside for
most of the day. When asked about rehab, pt states, "hell yeah I am going to
rehab!" Regular CIWA assessments completed, hourly rounding completed.

## 2021-08-01 VITALS — SYSTOLIC BLOOD PRESSURE: 149 MMHG | DIASTOLIC BLOOD PRESSURE: 54 MMHG

## 2021-08-01 VITALS — DIASTOLIC BLOOD PRESSURE: 94 MMHG | SYSTOLIC BLOOD PRESSURE: 166 MMHG

## 2021-08-01 VITALS — SYSTOLIC BLOOD PRESSURE: 167 MMHG | DIASTOLIC BLOOD PRESSURE: 70 MMHG

## 2021-08-01 VITALS — DIASTOLIC BLOOD PRESSURE: 66 MMHG | SYSTOLIC BLOOD PRESSURE: 148 MMHG

## 2021-08-01 VITALS — DIASTOLIC BLOOD PRESSURE: 85 MMHG | SYSTOLIC BLOOD PRESSURE: 155 MMHG

## 2021-08-01 VITALS — DIASTOLIC BLOOD PRESSURE: 77 MMHG | SYSTOLIC BLOOD PRESSURE: 144 MMHG

## 2021-08-01 LAB
ANION GAP SERPL CALC-SCNC: 9 MMOL/L (ref 7–16)
BUN SERPL-MCNC: 8 MG/DL (ref 7–18)
CALCIUM SERPL-MCNC: 7.5 MG/DL (ref 8.5–10.1)
CHLORIDE SERPL-SCNC: 111 MMOL/L (ref 98–107)
CO2 SERPL-SCNC: 27 MMOL/L (ref 21–32)
CREAT SERPL-MCNC: 0.9 MG/DL (ref 0.6–1.3)
GLUCOSE SERPL-MCNC: 87 MG/DL (ref 70–99)
MAGNESIUM SERPL-MCNC: 1.8 MG/DL (ref 1.8–2.4)
PHOSPHATE SERPL-MCNC: 3.4 MG/DL (ref 2.5–4.9)
POTASSIUM SERPL-SCNC: 3.7 MMOL/L (ref 3.5–5.1)
SODIUM SERPL-SCNC: 147 MMOL/L (ref 136–145)

## 2021-08-01 NOTE — NUR
ASSUMED PT CARE AT APPROX 1930. PT IS AWAKE, ORIENTED TO SELF AND PLACE, PT IS
IMPULSIVE AND IRRITABLE AT TIMES. ALCOHOL WITHDRAWAL ASSESSMENT CHARTED,
ATIVAN GIVEN PER MAR. PT IS TRACING SR/ST WITH BBB ON THE CARDIAC MONITOR. PT
IS CLOSELY MONITORED. HIGH FALL PRECAUTIONS AND SEIZURE PRECAUTIONS IN PLACE.
CALL LIGHT WITHIN REACH.

## 2021-08-02 VITALS — SYSTOLIC BLOOD PRESSURE: 141 MMHG | DIASTOLIC BLOOD PRESSURE: 73 MMHG

## 2021-08-02 VITALS — SYSTOLIC BLOOD PRESSURE: 117 MMHG | DIASTOLIC BLOOD PRESSURE: 66 MMHG

## 2021-08-02 VITALS — SYSTOLIC BLOOD PRESSURE: 162 MMHG | DIASTOLIC BLOOD PRESSURE: 67 MMHG

## 2021-08-02 VITALS — SYSTOLIC BLOOD PRESSURE: 159 MMHG | DIASTOLIC BLOOD PRESSURE: 82 MMHG

## 2021-08-02 VITALS — SYSTOLIC BLOOD PRESSURE: 145 MMHG | DIASTOLIC BLOOD PRESSURE: 74 MMHG

## 2021-08-02 VITALS — DIASTOLIC BLOOD PRESSURE: 66 MMHG | SYSTOLIC BLOOD PRESSURE: 126 MMHG

## 2021-08-02 VITALS — DIASTOLIC BLOOD PRESSURE: 76 MMHG | SYSTOLIC BLOOD PRESSURE: 160 MMHG

## 2021-08-02 VITALS — SYSTOLIC BLOOD PRESSURE: 155 MMHG | DIASTOLIC BLOOD PRESSURE: 63 MMHG

## 2021-08-02 VITALS — DIASTOLIC BLOOD PRESSURE: 71 MMHG | SYSTOLIC BLOOD PRESSURE: 134 MMHG

## 2021-08-02 VITALS — DIASTOLIC BLOOD PRESSURE: 84 MMHG | SYSTOLIC BLOOD PRESSURE: 176 MMHG

## 2021-08-02 VITALS — SYSTOLIC BLOOD PRESSURE: 151 MMHG | DIASTOLIC BLOOD PRESSURE: 72 MMHG

## 2021-08-02 VITALS — SYSTOLIC BLOOD PRESSURE: 148 MMHG | DIASTOLIC BLOOD PRESSURE: 80 MMHG

## 2021-08-02 VITALS — SYSTOLIC BLOOD PRESSURE: 150 MMHG | DIASTOLIC BLOOD PRESSURE: 73 MMHG

## 2021-08-02 VITALS — DIASTOLIC BLOOD PRESSURE: 76 MMHG | SYSTOLIC BLOOD PRESSURE: 133 MMHG

## 2021-08-02 VITALS — DIASTOLIC BLOOD PRESSURE: 86 MMHG | SYSTOLIC BLOOD PRESSURE: 164 MMHG

## 2021-08-02 VITALS — DIASTOLIC BLOOD PRESSURE: 75 MMHG | SYSTOLIC BLOOD PRESSURE: 108 MMHG

## 2021-08-02 VITALS — DIASTOLIC BLOOD PRESSURE: 73 MMHG | SYSTOLIC BLOOD PRESSURE: 155 MMHG

## 2021-08-02 VITALS — SYSTOLIC BLOOD PRESSURE: 147 MMHG | DIASTOLIC BLOOD PRESSURE: 64 MMHG

## 2021-08-02 VITALS — SYSTOLIC BLOOD PRESSURE: 150 MMHG | DIASTOLIC BLOOD PRESSURE: 80 MMHG

## 2021-08-02 VITALS — SYSTOLIC BLOOD PRESSURE: 160 MMHG | DIASTOLIC BLOOD PRESSURE: 85 MMHG

## 2021-08-02 VITALS — DIASTOLIC BLOOD PRESSURE: 72 MMHG | SYSTOLIC BLOOD PRESSURE: 138 MMHG

## 2021-08-02 VITALS — DIASTOLIC BLOOD PRESSURE: 88 MMHG | SYSTOLIC BLOOD PRESSURE: 166 MMHG

## 2021-08-02 VITALS — DIASTOLIC BLOOD PRESSURE: 76 MMHG | SYSTOLIC BLOOD PRESSURE: 150 MMHG

## 2021-08-02 VITALS — SYSTOLIC BLOOD PRESSURE: 142 MMHG | DIASTOLIC BLOOD PRESSURE: 72 MMHG

## 2021-08-02 LAB
ABSOLUTE BASOPHILS: 0.1 THOU/UL (ref 0–0.2)
ABSOLUTE EOSINOPHILS: 0.4 THOU/UL (ref 0–0.7)
ABSOLUTE MONOCYTES: 0.5 THOU/UL (ref 0–1.2)
ALBUMIN SERPL-MCNC: 2.9 G/DL (ref 3.4–5)
ALP SERPL-CCNC: 102 U/L (ref 46–116)
ALT SERPL-CCNC: 20 U/L (ref 30–65)
ANION GAP SERPL CALC-SCNC: 7 MMOL/L (ref 7–16)
AST SERPL-CCNC: 12 U/L (ref 15–37)
BASOPHILS NFR BLD AUTO: 1.2 %
BILIRUB SERPL-MCNC: 0.5 MG/DL
BUN SERPL-MCNC: 8 MG/DL (ref 7–18)
CALCIUM SERPL-MCNC: 7.9 MG/DL (ref 8.5–10.1)
CHLORIDE SERPL-SCNC: 111 MMOL/L (ref 98–107)
CO2 SERPL-SCNC: 29 MMOL/L (ref 21–32)
CREAT SERPL-MCNC: 0.9 MG/DL (ref 0.6–1.3)
EOSINOPHIL NFR BLD: 6.3 %
GLUCOSE SERPL-MCNC: 106 MG/DL (ref 70–99)
GRANULOCYTES NFR BLD MANUAL: 66.2 %
HCT VFR BLD CALC: 39.9 % (ref 42–52)
HGB BLD-MCNC: 13.6 GM/DL (ref 14–18)
LYMPHOCYTES # BLD: 1.3 THOU/UL (ref 0.8–5.3)
LYMPHOCYTES NFR BLD AUTO: 19.3 %
MCH RBC QN AUTO: 31.3 PG (ref 26–34)
MCHC RBC AUTO-ENTMCNC: 34.1 G/DL (ref 28–37)
MCV RBC: 91.6 FL (ref 80–100)
MONOCYTES NFR BLD: 7 %
MPV: 7.4 FL. (ref 7.2–11.1)
NEUTROPHILS # BLD: 4.4 THOU/UL (ref 1.6–8.1)
NUCLEATED RBCS: 0 /100WBC
PLATELET COUNT*: 154 THOU/UL (ref 150–400)
POTASSIUM SERPL-SCNC: 3.5 MMOL/L (ref 3.5–5.1)
PROT SERPL-MCNC: 6.7 G/DL (ref 6.4–8.2)
RBC # BLD AUTO: 4.35 MIL/UL (ref 4.5–6)
RDW-CV: 15.2 % (ref 10.5–14.5)
SODIUM SERPL-SCNC: 147 MMOL/L (ref 136–145)
WBC # BLD AUTO: 6.6 THOU/UL (ref 4–11)

## 2021-08-02 NOTE — NUR
RECIEVED PT FROM TELE WITH CIWA OF 23 AND TRYING TO GET OUT OF THE BED, ATIVAN
GIVEN REORIENT PT. PRECEDEX STARTED AND PT WAS ASLEEP AND NO EPISODE OF
AGITATION.  NO DISTRESS AND NO SIEZURE NOTED. CONTINUE MONITORING AND TOWARDS
GOALS.

## 2021-08-02 NOTE — NUR
CIWA SCORES HAVE BEEN MORE THAN 20 MULTIPLE TIMES THIS SHIFT, PT IS TRANSFERED
TO ICU FOR CLOSE MONITORING AT APPROX 2310 AFTER REPORT GIVEN TO RJ CHINCHILLA.

## 2021-08-02 NOTE — NUR
barriers to dc: CIWA greater than 20. precedex gtt. [facility]

## 2021-08-03 VITALS — DIASTOLIC BLOOD PRESSURE: 73 MMHG | SYSTOLIC BLOOD PRESSURE: 139 MMHG

## 2021-08-03 VITALS — SYSTOLIC BLOOD PRESSURE: 139 MMHG | DIASTOLIC BLOOD PRESSURE: 75 MMHG

## 2021-08-03 VITALS — SYSTOLIC BLOOD PRESSURE: 134 MMHG | DIASTOLIC BLOOD PRESSURE: 74 MMHG

## 2021-08-03 VITALS — DIASTOLIC BLOOD PRESSURE: 85 MMHG | SYSTOLIC BLOOD PRESSURE: 142 MMHG

## 2021-08-03 VITALS — SYSTOLIC BLOOD PRESSURE: 137 MMHG | DIASTOLIC BLOOD PRESSURE: 68 MMHG

## 2021-08-03 VITALS — SYSTOLIC BLOOD PRESSURE: 138 MMHG | DIASTOLIC BLOOD PRESSURE: 72 MMHG

## 2021-08-03 VITALS — SYSTOLIC BLOOD PRESSURE: 122 MMHG | DIASTOLIC BLOOD PRESSURE: 60 MMHG

## 2021-08-03 VITALS — DIASTOLIC BLOOD PRESSURE: 80 MMHG | SYSTOLIC BLOOD PRESSURE: 152 MMHG

## 2021-08-03 VITALS — DIASTOLIC BLOOD PRESSURE: 129 MMHG | SYSTOLIC BLOOD PRESSURE: 156 MMHG

## 2021-08-03 VITALS — SYSTOLIC BLOOD PRESSURE: 132 MMHG | DIASTOLIC BLOOD PRESSURE: 73 MMHG

## 2021-08-03 LAB
ALBUMIN SERPL-MCNC: 2.5 G/DL (ref 3.4–5)
ALP SERPL-CCNC: 86 U/L (ref 46–116)
ALT SERPL-CCNC: 17 U/L (ref 30–65)
ANION GAP SERPL CALC-SCNC: 6 MMOL/L (ref 7–16)
AST SERPL-CCNC: 12 U/L (ref 15–37)
BILIRUB SERPL-MCNC: 0.6 MG/DL
BUN SERPL-MCNC: 10 MG/DL (ref 7–18)
CALCIUM SERPL-MCNC: 7.8 MG/DL (ref 8.5–10.1)
CHLORIDE SERPL-SCNC: 109 MMOL/L (ref 98–107)
CO2 SERPL-SCNC: 29 MMOL/L (ref 21–32)
CREAT SERPL-MCNC: 0.9 MG/DL (ref 0.6–1.3)
GLUCOSE SERPL-MCNC: 91 MG/DL (ref 70–99)
HCT VFR BLD CALC: 36.7 % (ref 42–52)
HGB BLD-MCNC: 12.5 GM/DL (ref 14–18)
MAGNESIUM SERPL-MCNC: 1.7 MG/DL (ref 1.8–2.4)
MCH RBC QN AUTO: 31.2 PG (ref 26–34)
MCHC RBC AUTO-ENTMCNC: 33.9 G/DL (ref 28–37)
MCV RBC: 92 FL (ref 80–100)
MPV: 7.6 FL. (ref 7.2–11.1)
PLATELET COUNT*: 145 THOU/UL (ref 150–400)
POTASSIUM SERPL-SCNC: 3.1 MMOL/L (ref 3.5–5.1)
PROT SERPL-MCNC: 5.9 G/DL (ref 6.4–8.2)
RBC # BLD AUTO: 3.99 MIL/UL (ref 4.5–6)
RDW-CV: 15.1 % (ref 10.5–14.5)
SODIUM SERPL-SCNC: 144 MMOL/L (ref 136–145)
WBC # BLD AUTO: 5.9 THOU/UL (ref 4–11)

## 2021-08-03 NOTE — NUR
ASSUMED CARE AT 1900H, ON RA AND TOLERATED. ON PRECEDEX DRIP AND STOP AT
2030H DUE TO BRADYCARDIA. PT WAS ORIENTED AND CIWA 0F 5. NO FEVER BUT STILL
WITH WHEEZES. PT ATE WELL HIS SNACK BOX. PT ASKED IF HE CAN GO HOME TODAY.
CONTINUE MONITORING AND TOWARDS AND GOALS.

## 2021-08-03 NOTE — NUR
per dr khan's progress note, inpt pysch tx not needed at this. etoh tx
recommended; however, pt indicated he wants to d/c from [facility] to home and he
will work with his  at [facility] to get
residential tx re-arranged. pt nor his wife, anel ([phone]) no the name of
the residential tx facility, or the name of his cm. pt indicated his friend,
jasson can provide transportation to home at d/c.

## 2021-08-03 NOTE — NUR
PT IS BEING TRANSFERED TO ROOM 201 ALL BELONGINGS PACKED AND SENT WITH PT VIA
WHEELCHAIR BY NURSING STAFF

## 2021-08-04 VITALS — SYSTOLIC BLOOD PRESSURE: 142 MMHG | DIASTOLIC BLOOD PRESSURE: 82 MMHG

## 2021-08-04 VITALS — SYSTOLIC BLOOD PRESSURE: 141 MMHG | DIASTOLIC BLOOD PRESSURE: 74 MMHG

## 2021-08-04 VITALS — DIASTOLIC BLOOD PRESSURE: 75 MMHG | SYSTOLIC BLOOD PRESSURE: 137 MMHG

## 2021-08-04 VITALS — SYSTOLIC BLOOD PRESSURE: 173 MMHG | DIASTOLIC BLOOD PRESSURE: 90 MMHG

## 2021-08-04 VITALS — SYSTOLIC BLOOD PRESSURE: 165 MMHG | DIASTOLIC BLOOD PRESSURE: 88 MMHG

## 2021-08-04 VITALS — SYSTOLIC BLOOD PRESSURE: 147 MMHG | DIASTOLIC BLOOD PRESSURE: 78 MMHG

## 2021-08-04 LAB
ALBUMIN SERPL-MCNC: 2.7 G/DL (ref 3.4–5)
ALP SERPL-CCNC: 92 U/L (ref 46–116)
ALT SERPL-CCNC: 20 U/L (ref 30–65)
ANION GAP SERPL CALC-SCNC: 7 MMOL/L (ref 7–16)
AST SERPL-CCNC: 12 U/L (ref 15–37)
BILIRUB SERPL-MCNC: 0.4 MG/DL
BUN SERPL-MCNC: 12 MG/DL (ref 7–18)
CALCIUM SERPL-MCNC: 8.3 MG/DL (ref 8.5–10.1)
CHLORIDE SERPL-SCNC: 110 MMOL/L (ref 98–107)
CO2 SERPL-SCNC: 28 MMOL/L (ref 21–32)
CREAT SERPL-MCNC: 1.1 MG/DL (ref 0.6–1.3)
GLUCOSE SERPL-MCNC: 95 MG/DL (ref 70–99)
HCT VFR BLD CALC: 34.8 % (ref 42–52)
HGB BLD-MCNC: 11.8 GM/DL (ref 14–18)
MAGNESIUM SERPL-MCNC: 1.8 MG/DL (ref 1.8–2.4)
MCH RBC QN AUTO: 31.2 PG (ref 26–34)
MCHC RBC AUTO-ENTMCNC: 34 G/DL (ref 28–37)
MCV RBC: 91.7 FL (ref 80–100)
MPV: 7.8 FL. (ref 7.2–11.1)
PLATELET COUNT*: 154 THOU/UL (ref 150–400)
POTASSIUM SERPL-SCNC: 4.2 MMOL/L (ref 3.5–5.1)
PROT SERPL-MCNC: 6.4 G/DL (ref 6.4–8.2)
RBC # BLD AUTO: 3.8 MIL/UL (ref 4.5–6)
RDW-CV: 15.8 % (ref 10.5–14.5)
SODIUM SERPL-SCNC: 145 MMOL/L (ref 136–145)
WBC # BLD AUTO: 5.1 THOU/UL (ref 4–11)

## 2021-08-04 NOTE — NUR
PT RESTLESS, CONFUSED, ORIENTED TO SELF ONLY. PT DENIES HAVING C/O PAIN UPON
ASSESMENT. HE HAS MADE SEVERAL ATTEMPTS TO CLIMB OUT OF BED BECAUSE HE SAYS HE
HAS TO GOT TO THE BANK BUT HAS BEEN EASILY REDIRECTED.

## 2021-08-05 VITALS — DIASTOLIC BLOOD PRESSURE: 84 MMHG | SYSTOLIC BLOOD PRESSURE: 141 MMHG

## 2021-08-05 VITALS — DIASTOLIC BLOOD PRESSURE: 62 MMHG | SYSTOLIC BLOOD PRESSURE: 140 MMHG

## 2021-08-05 VITALS — DIASTOLIC BLOOD PRESSURE: 78 MMHG | SYSTOLIC BLOOD PRESSURE: 152 MMHG

## 2021-08-05 VITALS — DIASTOLIC BLOOD PRESSURE: 79 MMHG | SYSTOLIC BLOOD PRESSURE: 155 MMHG

## 2021-08-05 VITALS — DIASTOLIC BLOOD PRESSURE: 78 MMHG | SYSTOLIC BLOOD PRESSURE: 131 MMHG

## 2021-08-05 VITALS — SYSTOLIC BLOOD PRESSURE: 146 MMHG | DIASTOLIC BLOOD PRESSURE: 79 MMHG

## 2021-08-05 NOTE — NUR
ASSUMED PATIENT CARE AT 1900. ASSESSMENT COMPLETED AS CHARTED. CARDIAC
MONITORING IN PLACE. HOURLY ROUNDING IN PLACE FOR PATIENT SAFETY. FALL
PRECAUTIONS IN PLACE FOR PATIENT SAFETY. BED LOCKED AND IN LOWEST POSITION.
CLWR.

## 2021-08-06 VITALS — SYSTOLIC BLOOD PRESSURE: 117 MMHG | DIASTOLIC BLOOD PRESSURE: 58 MMHG

## 2021-08-06 VITALS — DIASTOLIC BLOOD PRESSURE: 69 MMHG | SYSTOLIC BLOOD PRESSURE: 143 MMHG

## 2021-08-06 VITALS — SYSTOLIC BLOOD PRESSURE: 141 MMHG | DIASTOLIC BLOOD PRESSURE: 62 MMHG

## 2021-08-06 VITALS — SYSTOLIC BLOOD PRESSURE: 146 MMHG | DIASTOLIC BLOOD PRESSURE: 80 MMHG

## 2021-08-06 VITALS — SYSTOLIC BLOOD PRESSURE: 150 MMHG | DIASTOLIC BLOOD PRESSURE: 70 MMHG

## 2021-08-06 VITALS — SYSTOLIC BLOOD PRESSURE: 142 MMHG | DIASTOLIC BLOOD PRESSURE: 72 MMHG

## 2021-08-06 NOTE — NUR
TOOK OVER PT CARE FROM RENÉE JOYCE AT APPROX 1500, PT AOX4, NO C/O PAIN, IV
MEDS FINISHING UP AND DR ANTUNEZ NOTIFIED THAT PT IS READY FOR DC, DC ORDERS
RECEIVED. IV AND CARDIAC MONITOR REMOVED. PT DC'D BY WC W/ NURSING STAFF AND
ALL PAPERWORK AND PERSONAL BELONGINGS TO SON'S VEHICLE AT APPROX 1705

## 2021-08-07 VITALS — DIASTOLIC BLOOD PRESSURE: 66 MMHG | SYSTOLIC BLOOD PRESSURE: 129 MMHG

## 2021-08-07 VITALS — DIASTOLIC BLOOD PRESSURE: 57 MMHG | SYSTOLIC BLOOD PRESSURE: 119 MMHG

## 2021-08-07 VITALS — DIASTOLIC BLOOD PRESSURE: 59 MMHG | SYSTOLIC BLOOD PRESSURE: 142 MMHG

## 2021-08-07 VITALS — SYSTOLIC BLOOD PRESSURE: 137 MMHG | DIASTOLIC BLOOD PRESSURE: 82 MMHG

## 2021-08-07 VITALS — SYSTOLIC BLOOD PRESSURE: 140 MMHG | DIASTOLIC BLOOD PRESSURE: 79 MMHG

## 2021-08-07 LAB
ANION GAP SERPL CALC-SCNC: 8 MMOL/L (ref 7–16)
BUN SERPL-MCNC: 23 MG/DL (ref 7–18)
CALCIUM SERPL-MCNC: 8.3 MG/DL (ref 8.5–10.1)
CHLORIDE SERPL-SCNC: 108 MMOL/L (ref 98–107)
CO2 SERPL-SCNC: 28 MMOL/L (ref 21–32)
CREAT SERPL-MCNC: 0.9 MG/DL (ref 0.6–1.3)
GLUCOSE SERPL-MCNC: 103 MG/DL (ref 70–99)
HCT VFR BLD CALC: 40.8 % (ref 42–52)
HGB BLD-MCNC: 13.3 GM/DL (ref 14–18)
MCH RBC QN AUTO: 30.4 PG (ref 26–34)
MCHC RBC AUTO-ENTMCNC: 32.5 G/DL (ref 28–37)
MCV RBC: 93.4 FL (ref 80–100)
MPV: 8.4 FL. (ref 7.2–11.1)
PLATELET COUNT*: 182 THOU/UL (ref 150–400)
POTASSIUM SERPL-SCNC: 4.2 MMOL/L (ref 3.5–5.1)
RBC # BLD AUTO: 4.37 MIL/UL (ref 4.5–6)
RDW-CV: 15.8 % (ref 10.5–14.5)
SODIUM SERPL-SCNC: 144 MMOL/L (ref 136–145)
WBC # BLD AUTO: 7.3 THOU/UL (ref 4–11)

## 2021-08-08 VITALS — SYSTOLIC BLOOD PRESSURE: 116 MMHG | DIASTOLIC BLOOD PRESSURE: 65 MMHG

## 2021-08-08 VITALS — SYSTOLIC BLOOD PRESSURE: 116 MMHG | DIASTOLIC BLOOD PRESSURE: 57 MMHG

## 2021-08-08 VITALS — SYSTOLIC BLOOD PRESSURE: 147 MMHG | DIASTOLIC BLOOD PRESSURE: 90 MMHG

## 2021-08-08 VITALS — SYSTOLIC BLOOD PRESSURE: 168 MMHG | DIASTOLIC BLOOD PRESSURE: 77 MMHG

## 2021-08-08 VITALS — SYSTOLIC BLOOD PRESSURE: 128 MMHG | DIASTOLIC BLOOD PRESSURE: 69 MMHG

## 2021-08-08 LAB
ABSOLUTE MONOCYTES: 0.3 THOU/UL (ref 0–1.2)
ALBUMIN SERPL-MCNC: 3 G/DL (ref 3.4–5)
ALP SERPL-CCNC: 76 U/L (ref 46–116)
ALT SERPL-CCNC: 15 U/L (ref 30–65)
ANION GAP SERPL CALC-SCNC: 13 MMOL/L (ref 7–16)
ANISOCYTOSIS BLD QL SMEAR: (no result)
AST SERPL-CCNC: 23 U/L (ref 15–37)
BILIRUB SERPL-MCNC: 0.5 MG/DL
BUN SERPL-MCNC: 27 MG/DL (ref 7–18)
CALCIUM SERPL-MCNC: 8.5 MG/DL (ref 8.5–10.1)
CHLORIDE SERPL-SCNC: 105 MMOL/L (ref 98–107)
CO2 SERPL-SCNC: 23 MMOL/L (ref 21–32)
CREAT SERPL-MCNC: 1 MG/DL (ref 0.6–1.3)
GLUCOSE SERPL-MCNC: 105 MG/DL (ref 70–99)
GRANULOCYTES NFR BLD MANUAL: 93 %
HCT VFR BLD CALC: 41.2 % (ref 42–52)
HGB BLD-MCNC: 13.9 GM/DL (ref 14–18)
LYMPHOCYTES # BLD: 0.3 THOU/UL (ref 0.8–5.3)
LYMPHOCYTES NFR BLD AUTO: 4 %
MCH RBC QN AUTO: 31.2 PG (ref 26–34)
MCHC RBC AUTO-ENTMCNC: 33.8 G/DL (ref 28–37)
MCV RBC: 92.3 FL (ref 80–100)
MONOCYTES NFR BLD: 3 %
MPV: 8.4 FL. (ref 7.2–11.1)
NEUTROPHILS # BLD: 8 THOU/UL (ref 1.6–8.1)
NUCLEATED RBCS: 0 /100WBC
PLATELET # BLD EST: ADEQUATE 10*3/UL
PLATELET COUNT*: 187 THOU/UL (ref 150–400)
POIKILOCYTOSIS BLD QL SMEAR: (no result)
POTASSIUM SERPL-SCNC: 4 MMOL/L (ref 3.5–5.1)
PROT SERPL-MCNC: 6.8 G/DL (ref 6.4–8.2)
RBC # BLD AUTO: 4.46 MIL/UL (ref 4.5–6)
RDW-CV: 15.6 % (ref 10.5–14.5)
SODIUM SERPL-SCNC: 141 MMOL/L (ref 136–145)
WBC # BLD AUTO: 8.6 THOU/UL (ref 4–11)

## 2021-08-08 NOTE — NUR
RECEIVED REPORT AND ASSUMED CARE OF PT, ASSESSMENT COMPLETED. PT VERY CONFUSED
AND IMPULSIVE. ATTEMPTING MULTIPLE TIMES TO GET OUT OF BED TO GO HOME.
ASSISTED TO BSC FOR LG BM. IV ATIVAN GIVEN. TELEMETRY ON SHOWING SR. WILL CONT
TO MONITOR AND ASSIST AS NEEDED.

## 2021-08-09 VITALS — DIASTOLIC BLOOD PRESSURE: 53 MMHG | SYSTOLIC BLOOD PRESSURE: 104 MMHG

## 2021-08-09 VITALS — DIASTOLIC BLOOD PRESSURE: 76 MMHG | SYSTOLIC BLOOD PRESSURE: 134 MMHG

## 2021-08-09 VITALS — DIASTOLIC BLOOD PRESSURE: 68 MMHG | SYSTOLIC BLOOD PRESSURE: 123 MMHG

## 2021-08-09 VITALS — SYSTOLIC BLOOD PRESSURE: 108 MMHG | DIASTOLIC BLOOD PRESSURE: 72 MMHG

## 2021-08-09 VITALS — DIASTOLIC BLOOD PRESSURE: 74 MMHG | SYSTOLIC BLOOD PRESSURE: 116 MMHG

## 2021-08-09 VITALS — SYSTOLIC BLOOD PRESSURE: 125 MMHG | DIASTOLIC BLOOD PRESSURE: 74 MMHG

## 2021-08-09 VITALS — SYSTOLIC BLOOD PRESSURE: 131 MMHG | DIASTOLIC BLOOD PRESSURE: 78 MMHG

## 2021-08-09 NOTE — NUR
ASSUMED CARE OF PT AT 0730. PT A&0X4, FORGETFUL AND CONFUSED AT TIMES, SLURRED
SPEECH NOTED AT TIMES AS WELL. SEIZURE PRECAUTIONS IN PLACE. TRACING SR ON THE
CARDIAC MONITOR. ON RA SAT MID 90'S. DENIES ANY PAIN OR SHORTNESS OF BREATH.
PT INCONT OF BOWEL AND BLADDER. CIWA AS CHARTED.-16-18, PT AGITATED, CONFUSED,
IMPULSIVE AND TREMORS NOTED. PRN ATIVAN GIVEN PER EMAR. FAMILY FRIEND REMAINS
AT BEDSIDE AT THIS TIME.  PT UP WITH 2 ASSIST-WEAKNESS NOTED. PT ASKING FOR
TOBACCO CHEW- DR ANTUNEZ NOTIFIED AND ORDERS RECEIVED FOR NICOTINE PATCH-REFER
TO EMAR. AM ASSESSMENT AS CHARTED. MEDICATIONS PER MAR. PT REPOSITIONS SELF.
HOURLY ROUNDING OBSERVED. BED IN LOW POSITION. BED ALARM IN PLACE. FALL
PRECAUTIONS IN PLACE. CALL LIGHT WITHIN REACH. WILL CONTINUE PLAN OF CARE.

## 2021-08-09 NOTE — NUR
RECEIVED REPORT AND ASSUMED CARE OF PT, ASSESSMENT COMPLETED. PT CALMER THIS
EVENING AND MORE COOPERATIVE. PT NAUSEATED WITH EMESIS, STATES HE FEELS BETTER
AFTER THROWING UP. TELEMETRY ON SHOWING SR WITH BBB. WILL CONT TO MONITOR AND
ASSIST AS NEEDED.

## 2021-08-09 NOTE — NUR
AWAKE ALL NIGHT AND RESTLESS. CONSTANTLY WANTING TO GO HOME, ATTEMPTED TO
REORIENTATE. TOOK MEDS IN APPLESAUCE WITH NECTAR THICK LIQ, NO COUGHING OR
CHOKING. SEIZURE PRECAUTIONS IN PLACE AND SIDE RAILS PADDED. IV RESTARTED WITH
DIFFICULTY INTO LT HAND. TELEMETRY CONT TO SHOW SR. NO CHANGES IN ASSESSMENT.
HS GOAL OF SAFETY ACHIEVED. HOURLY ROUNDING OBSERVED.

## 2021-08-09 NOTE — NUR
Pt continues to withdraw, ciwa ranging from 12-18. ?placement at dc vs home
with friend. Per , wife does not want Pt to return home. Pt will need to be
more clear prior to dc, Pt would need to be able to agree to placement/sign
himself into a facility. CM to discuss dc with Pt's friend. Following.

## 2021-08-10 VITALS — SYSTOLIC BLOOD PRESSURE: 119 MMHG | DIASTOLIC BLOOD PRESSURE: 57 MMHG

## 2021-08-10 VITALS — SYSTOLIC BLOOD PRESSURE: 145 MMHG | DIASTOLIC BLOOD PRESSURE: 75 MMHG

## 2021-08-10 VITALS — SYSTOLIC BLOOD PRESSURE: 131 MMHG | DIASTOLIC BLOOD PRESSURE: 71 MMHG

## 2021-08-10 VITALS — SYSTOLIC BLOOD PRESSURE: 127 MMHG | DIASTOLIC BLOOD PRESSURE: 60 MMHG

## 2021-08-10 VITALS — DIASTOLIC BLOOD PRESSURE: 66 MMHG | SYSTOLIC BLOOD PRESSURE: 133 MMHG

## 2021-08-10 NOTE — NUR
SLEPT WELL TONIGHT. ASSISTED TO BSC X1, UNSTEADY BUT DID WELL. TOLERATED PO
MEDS IN APPLESAUCE AND NECTAR THICK LIQUIDS. NO CHANGE IN ASSESSMENT. HS GOALS
OF REST AND SAFETY ACHIEVED.

## 2021-08-10 NOTE — NUR
NO ACUTE CHANGES THROUGHOUT SHIFT. PT IMPULSIVE, CONFUSED, REQUESTING CHEW AND
LIQUOR. CIWA AS CHARTED. PRN PO ATIVAN GIVEN PER EMAR. PT FRIEND, LUPE, HERE
THROUGHOUT SHIFT. PT WIFE, YOANDY-CALLED MULTIPLE TIMES THROUGHOUT
SHIFT-UPDATED ON CURRENT PLAN OF CARE EACH TIME. PT DENIES ANY PAIN OR
SHORTNESS OF BREATH. TRACING SR ON THE CARDIAC MONITOR. INCONT OF BLADDER
THROUGHOUT SHIFT. AM ASSESSMENT AS CHARTED. MEDS PER MAR. CALL LIGHT WITHIN
REACH. FALL PRECAUTIONS IN PLACE. BED ALARM IN PLACE. WILL CONTINUE PLAN OF
CARE.

## 2021-08-11 VITALS — SYSTOLIC BLOOD PRESSURE: 128 MMHG | DIASTOLIC BLOOD PRESSURE: 59 MMHG

## 2021-08-11 VITALS — SYSTOLIC BLOOD PRESSURE: 140 MMHG | DIASTOLIC BLOOD PRESSURE: 62 MMHG

## 2021-08-11 VITALS — DIASTOLIC BLOOD PRESSURE: 66 MMHG | SYSTOLIC BLOOD PRESSURE: 131 MMHG

## 2021-08-11 VITALS — DIASTOLIC BLOOD PRESSURE: 67 MMHG | SYSTOLIC BLOOD PRESSURE: 136 MMHG

## 2021-08-11 VITALS — SYSTOLIC BLOOD PRESSURE: 115 MMHG | DIASTOLIC BLOOD PRESSURE: 72 MMHG

## 2021-08-11 VITALS — DIASTOLIC BLOOD PRESSURE: 77 MMHG | SYSTOLIC BLOOD PRESSURE: 144 MMHG

## 2021-08-11 VITALS — SYSTOLIC BLOOD PRESSURE: 143 MMHG | DIASTOLIC BLOOD PRESSURE: 74 MMHG

## 2021-08-11 LAB
ALBUMIN SERPL-MCNC: 2.8 G/DL (ref 3.4–5)
ALP SERPL-CCNC: 72 U/L (ref 46–116)
ALT SERPL-CCNC: 13 U/L (ref 30–65)
ANION GAP SERPL CALC-SCNC: 6 MMOL/L (ref 7–16)
AST SERPL-CCNC: 9 U/L (ref 15–37)
BILIRUB SERPL-MCNC: 0.4 MG/DL
BUN SERPL-MCNC: 21 MG/DL (ref 7–18)
CALCIUM SERPL-MCNC: 8.2 MG/DL (ref 8.5–10.1)
CHLORIDE SERPL-SCNC: 106 MMOL/L (ref 98–107)
CO2 SERPL-SCNC: 30 MMOL/L (ref 21–32)
CREAT SERPL-MCNC: 1 MG/DL (ref 0.6–1.3)
GLUCOSE SERPL-MCNC: 94 MG/DL (ref 70–99)
HCT VFR BLD CALC: 40.7 % (ref 42–52)
HGB BLD-MCNC: 13.7 GM/DL (ref 14–18)
MCH RBC QN AUTO: 30.9 PG (ref 26–34)
MCHC RBC AUTO-ENTMCNC: 33.6 G/DL (ref 28–37)
MCV RBC: 92.1 FL (ref 80–100)
MPV: 8 FL. (ref 7.2–11.1)
PLATELET COUNT*: 155 THOU/UL (ref 150–400)
POTASSIUM SERPL-SCNC: 3.6 MMOL/L (ref 3.5–5.1)
PROT SERPL-MCNC: 6 G/DL (ref 6.4–8.2)
RBC # BLD AUTO: 4.42 MIL/UL (ref 4.5–6)
RDW-CV: 15.8 % (ref 10.5–14.5)
SODIUM SERPL-SCNC: 142 MMOL/L (ref 136–145)
WBC # BLD AUTO: 10.7 THOU/UL (ref 4–11)

## 2021-08-11 NOTE — NUR
Patient continues to withdraw from alcohol. patient tried to climb out of bed
most ofthe time tonight. Ativan admistered a few times. Had several episodes
of incontinence. Patient safety maintained with bed height on low position,
bed armed and seizure pads intact. Will continue to care.

## 2021-08-11 NOTE — NUR
Ciwas still up. CM attempted to locate wife's phone number to discuss POC. CM
asked U/S to get wife's number the next time she calls.

## 2021-08-11 NOTE — NUR
HAWK AS CHARTED. PRN PO ATIVAN GIVEN PER EMAR. PT IMPULSIVE AND CONFUSED.
INCONT OF BOWEL AND BLADDER. BED ALARM IN PLACE. FALL PRECAUTIONS IN PLACE.
CALL LIGHT WITHIN REACH. WILL CONTINUE PLAN OF CARE.

## 2021-08-12 VITALS — DIASTOLIC BLOOD PRESSURE: 71 MMHG | SYSTOLIC BLOOD PRESSURE: 144 MMHG

## 2021-08-12 VITALS — SYSTOLIC BLOOD PRESSURE: 110 MMHG | DIASTOLIC BLOOD PRESSURE: 61 MMHG

## 2021-08-12 VITALS — SYSTOLIC BLOOD PRESSURE: 131 MMHG | DIASTOLIC BLOOD PRESSURE: 59 MMHG

## 2021-08-12 VITALS — DIASTOLIC BLOOD PRESSURE: 72 MMHG | SYSTOLIC BLOOD PRESSURE: 136 MMHG

## 2021-08-12 VITALS — DIASTOLIC BLOOD PRESSURE: 55 MMHG | SYSTOLIC BLOOD PRESSURE: 111 MMHG

## 2021-08-12 LAB
ANION GAP SERPL CALC-SCNC: 7 MMOL/L (ref 7–16)
BUN SERPL-MCNC: 21 MG/DL (ref 7–18)
CALCIUM SERPL-MCNC: 8.2 MG/DL (ref 8.5–10.1)
CHLORIDE SERPL-SCNC: 108 MMOL/L (ref 98–107)
CO2 SERPL-SCNC: 28 MMOL/L (ref 21–32)
CREAT SERPL-MCNC: 0.9 MG/DL (ref 0.6–1.3)
GLUCOSE SERPL-MCNC: 111 MG/DL (ref 70–99)
HCT VFR BLD CALC: 41.9 % (ref 42–52)
HGB BLD-MCNC: 13.6 GM/DL (ref 14–18)
MAGNESIUM SERPL-MCNC: 2.2 MG/DL (ref 1.8–2.4)
MCH RBC QN AUTO: 30.1 PG (ref 26–34)
MCHC RBC AUTO-ENTMCNC: 32.6 G/DL (ref 28–37)
MCV RBC: 92.3 FL (ref 80–100)
MPV: 8.2 FL. (ref 7.2–11.1)
PLATELET COUNT*: 155 THOU/UL (ref 150–400)
POTASSIUM SERPL-SCNC: 4.1 MMOL/L (ref 3.5–5.1)
RBC # BLD AUTO: 4.54 MIL/UL (ref 4.5–6)
RDW-CV: 15.8 % (ref 10.5–14.5)
SODIUM SERPL-SCNC: 143 MMOL/L (ref 136–145)
WBC # BLD AUTO: 10.4 THOU/UL (ref 4–11)

## 2021-08-12 NOTE — NUR
PATIENT RESTING UP IN CHAIR. PATIENT IS UP WITH MINIMUM ASSIST WITH GAIT BELT
AND WALKER. PATIENT DOES SHUFFLE FEET. PATIENT IS IMPULSIVE, BED AND CHAIR
ALARMS USED. PATIENT HAS BEEN COOPERATIVE. ATIVAN GIVEN PRN. PATIENT HAS GOOD
APPETITE, NECTAR THICK LIQUIDS PUREED DIET. PATIENT DENIES ANY NEEDS AT THIS
TIME. CALL LIGHT WITHIN REACH.

## 2021-08-13 VITALS — SYSTOLIC BLOOD PRESSURE: 118 MMHG | DIASTOLIC BLOOD PRESSURE: 50 MMHG

## 2021-08-13 VITALS — DIASTOLIC BLOOD PRESSURE: 50 MMHG | SYSTOLIC BLOOD PRESSURE: 118 MMHG

## 2021-08-13 VITALS — SYSTOLIC BLOOD PRESSURE: 120 MMHG | DIASTOLIC BLOOD PRESSURE: 54 MMHG

## 2021-08-13 NOTE — NUR
PATIENT DISCHARGED TO HOME WITH HOME HEALTH. DISCHARGE PAPERS REVIEWED AND
SIGNED. PRESCRIPTION TRANSMITTED TO PHARMACY AND INFORMATION SHEETS GIVEN.
IV'S REMOVED. ATTEMPTED TO CALL ASHLEE(PATIENT'S WIFE X 2) WITH NO ANSWER.
PATIENT'S FRIEND ROSANNA IS TRANSPORTING TO HOME. PATIENT DENIES ANY FURTHER
NEEDS. PATIENT TAKEN BY WHEELCHAIR TO EXIT. LEFT WITH FRIEND.